# Patient Record
Sex: MALE | Race: WHITE
[De-identification: names, ages, dates, MRNs, and addresses within clinical notes are randomized per-mention and may not be internally consistent; named-entity substitution may affect disease eponyms.]

---

## 2020-10-10 ENCOUNTER — HOSPITAL ENCOUNTER (INPATIENT)
Dept: HOSPITAL 12 - ER | Age: 45
LOS: 1 days | Discharge: HOME | DRG: 303 | End: 2020-10-11
Payer: COMMERCIAL

## 2020-10-10 VITALS — HEIGHT: 76 IN | BODY MASS INDEX: 26.67 KG/M2 | WEIGHT: 219 LBS

## 2020-10-10 DIAGNOSIS — R73.9: ICD-10-CM

## 2020-10-10 DIAGNOSIS — I25.10: Primary | ICD-10-CM

## 2020-10-10 DIAGNOSIS — F14.90: ICD-10-CM

## 2020-10-10 DIAGNOSIS — M10.9: ICD-10-CM

## 2020-10-10 DIAGNOSIS — Z88.0: ICD-10-CM

## 2020-10-10 DIAGNOSIS — I10: ICD-10-CM

## 2020-10-10 DIAGNOSIS — Z82.49: ICD-10-CM

## 2020-10-10 DIAGNOSIS — E78.5: ICD-10-CM

## 2020-10-10 DIAGNOSIS — R79.89: ICD-10-CM

## 2020-10-10 PROCEDURE — A4663 DIALYSIS BLOOD PRESSURE CUFF: HCPCS

## 2020-10-10 PROCEDURE — G0378 HOSPITAL OBSERVATION PER HR: HCPCS

## 2020-10-10 NOTE — NUR
AT 2344 NTG SL ADIM ,CP =7/10, BP-157/90, HR=47,RR=14, AT 2349 CP 8-9/10 
NI=887/69, HR=61,RR=14, PT STATED THAT THE NITRO SL MADE HIM FEEL WORSE, MD VELÁSQUEZ NOTIFIED. MONITOR SHOWS NSR.

## 2020-10-11 VITALS — SYSTOLIC BLOOD PRESSURE: 139 MMHG | DIASTOLIC BLOOD PRESSURE: 79 MMHG

## 2020-10-11 LAB
ALP SERPL-CCNC: 118 U/L (ref 50–136)
ALT SERPL W/O P-5'-P-CCNC: 33 U/L (ref 16–63)
AST SERPL-CCNC: 26 U/L (ref 15–37)
BASOPHILS # BLD AUTO: 0.1 K/UL (ref 0–8)
BASOPHILS NFR BLD AUTO: 1.4 % (ref 0–2)
BILIRUB DIRECT SERPL-MCNC: < 0.1 MG/DL (ref 0–0.2)
BILIRUB SERPL-MCNC: 0.5 MG/DL (ref 0.2–1)
BUN SERPL-MCNC: 20 MG/DL (ref 7–18)
CHLORIDE SERPL-SCNC: 103 MMOL/L (ref 98–107)
CHOLEST SERPL-MCNC: 206 MG/DL (ref ?–200)
CO2 SERPL-SCNC: 26 MMOL/L (ref 21–32)
CREAT SERPL-MCNC: 1.1 MG/DL (ref 0.6–1.3)
EOSINOPHIL # BLD AUTO: 0.1 K/UL (ref 0–0.7)
EOSINOPHIL NFR BLD AUTO: 2 % (ref 0–7)
GLUCOSE SERPL-MCNC: 124 MG/DL (ref 74–106)
HCT VFR BLD AUTO: 41.5 % (ref 36.7–47.1)
HDLC SERPL-MCNC: 70 MG/DL (ref 40–60)
HGB BLD-MCNC: 14.2 G/DL (ref 12.5–16.3)
LYMPHOCYTES # BLD AUTO: 2 K/UL (ref 20–40)
LYMPHOCYTES NFR BLD AUTO: 35 % (ref 20.5–51.5)
MCH RBC QN AUTO: 30.9 UUG (ref 23.8–33.4)
MCHC RBC AUTO-ENTMCNC: 34 G/DL (ref 32.5–36.3)
MCV RBC AUTO: 90.5 FL (ref 73–96.2)
MONOCYTES # BLD AUTO: 0.6 K/UL (ref 2–10)
MONOCYTES NFR BLD AUTO: 11.4 % (ref 0–11)
NEUTROPHILS # BLD AUTO: 2.8 K/UL (ref 1.8–8.9)
NEUTROPHILS NFR BLD AUTO: 50.2 % (ref 38.5–71.5)
PLATELET # BLD AUTO: 246 K/UL (ref 152–348)
POTASSIUM SERPL-SCNC: 3.5 MMOL/L (ref 3.5–5.1)
RBC # BLD AUTO: 4.59 MIL/UL (ref 4.06–5.63)
TRIGL SERPL-MCNC: 119 MG/DL (ref 30–150)
WBC # BLD AUTO: 5.6 K/UL (ref 3.6–10.2)
WS STN SPEC: 6.8 G/DL (ref 6.4–8.2)

## 2020-10-11 NOTE — NUR
AT 0330 CALLED [facility] TO COVID RESULTS AND WAS INFORMED THAT [facility] STATED RESULTS NOT IN. I CALLED [facility] SPOKE TO CHRISTIAN AT 
0405. CHRISTIAN STAED SHE WOULD CALL ME BACK WITH AN UPDATE.

## 2020-10-11 NOTE — NUR
ORDER FOR DISCHARGE NOTED FROM [facility] AND PATIENT AWARE AND BEING PREPPED FOR DISCHARGE AT 
THIS TIME

## 2020-10-11 NOTE — NUR
PATIENT DISCHARGED WITH DISCHARGE INSTRUCTIONS AND ALL HIS PERSONAL BELONGINGS AND PATIENT 
INSTRUCTED TO CALL HIS PRIMARY DOCTOR FOR A FOLLOW UP APPOINTMENT WITHIN ONE WEEK AND ALSO 
TO FOLLOW UP WITH DR COOMBS WITHIN TWO TO THREE WEEKS PHONE NUMBER PROVIDED AND HE EXPRESSED 
UNDERSTANDING PATIENT ASSISTED TO HIS CAR WHICH IS PARKED ON [address] IN FRONT OF THE 
[employer].

## 2020-10-11 NOTE — NUR
CALLED AGAIN TO LAB SPOKE TO ZACK WHOM STATED THAT THE CARRIER PICKED UP COVID 
SPECIMEN AT 0100 FROM [facility] AND [facility] HAS NOT RECEIVED 
SPECIMEN. ZACK FROM THE LAB STATED SHE WOULD KEEP ME UPDATED.

## 2020-10-11 NOTE — NUR
PATIENT SEEN AND EXAMINED BY SUNNY NP WITH NEW ORDERS AND NOTED PATIENT IS ALERT AND 
ORIENTED ANXIOUS RE WANTING TO GO HOME BUT IS WAITING FOR ECHO AND CARDIOLOGIST TO SEE 
PATIENT PRIOR TO DISCHARGE.

## 2020-10-11 NOTE — NUR
Pt. admitted to  tele , under care of Dr. Díaz

Belongs List completed and all belongings sent with patient, no signs of acute 
distress noted

## 2020-10-11 NOTE — NUR
DR RUSH HERE TO SEE PATIENT AND STATED THAT PATIENT CAN BE DISCHARGED TODAY AND HE SHOULD 
BE INSTRUCTED TO FOLLOW UP WITH HIM IN 2-3 WEEKS.

## 2020-10-11 NOTE — NUR
RECEIVED PATIENT VIA GURNEY FROM ER. PATIENT IS A/O X4. PLACED ON TELE SB IN THE 40'S. ALL 
OTHER VS WNL. C/O OF CHEST PRESSURE ON AND OFF 2/10. NO RESP. DISTRESS NOTED. ORIENTED TO 
ROOM AND CALL LIGHT IN REACH. ALL NEEDS ATTENDED. WILL CONTINUE TO MONITOR.

## 2021-07-19 ENCOUNTER — OFFICE (OUTPATIENT)
Dept: URBAN - METROPOLITAN AREA CLINIC 45 | Facility: CLINIC | Age: 46
End: 2021-07-19

## 2021-07-19 VITALS — HEIGHT: 76 IN | WEIGHT: 220 LBS

## 2021-07-19 DIAGNOSIS — Z12.11 SCREENING FOR COLON CANCER: ICD-10-CM

## 2021-07-19 PROCEDURE — G0406 INPT/TELE FOLLOW UP 15: HCPCS | Performed by: INTERNAL MEDICINE

## 2021-07-19 PROCEDURE — 99203 OFFICE O/P NEW LOW 30 MIN: CPT | Performed by: INTERNAL MEDICINE

## 2021-07-19 NOTE — SERVICEHPINOTES
Patient presents for a screening colonoscopy. There has been no previous colon screening. The patient has no family history of colon cancer or other significant GI diseases. Patient denies fever, nausea, vomiting, dysphagia, reflux, abdominal pain, change in bowel habits, constipation, diarrhea, rectal bleeding, melena, and significant change in weight. Denies shortness of breath and chest pain. Past surgical history includes a ruptured appendix.  He is on allopurinol for gout.  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  Medical allergies penicillin.  He has had both covid vaccinations.  He does drink alcohol on a regular basis.

## 2022-01-31 ENCOUNTER — AMBULATORY SURGICAL CENTER (OUTPATIENT)
Dept: URBAN - METROPOLITAN AREA SURGERY 39 | Facility: SURGERY | Age: 47
End: 2022-01-31

## 2022-01-31 VITALS — HEIGHT: 76 IN | WEIGHT: 218 LBS

## 2022-01-31 DIAGNOSIS — Z12.11 ENCOUNTER FOR SCREENING FOR MALIGNANT NEOPLASM OF COLON: ICD-10-CM

## 2022-01-31 DIAGNOSIS — K63.5 POLYP OF COLON: ICD-10-CM

## 2022-01-31 LAB — SURGICAL: PDF REPORT: (no result)

## 2022-01-31 PROCEDURE — 45380 COLONOSCOPY AND BIOPSY: CPT | Performed by: INTERNAL MEDICINE
